# Patient Record
Sex: MALE | Race: WHITE
[De-identification: names, ages, dates, MRNs, and addresses within clinical notes are randomized per-mention and may not be internally consistent; named-entity substitution may affect disease eponyms.]

---

## 2018-02-09 ENCOUNTER — HOSPITAL ENCOUNTER (EMERGENCY)
Dept: HOSPITAL 31 - C.ER | Age: 43
Discharge: HOME | End: 2018-02-09
Payer: MEDICAID

## 2018-02-09 VITALS
SYSTOLIC BLOOD PRESSURE: 129 MMHG | RESPIRATION RATE: 20 BRPM | TEMPERATURE: 98 F | OXYGEN SATURATION: 96 % | DIASTOLIC BLOOD PRESSURE: 88 MMHG | HEART RATE: 102 BPM

## 2018-02-09 VITALS — BODY MASS INDEX: 22.6 KG/M2

## 2018-02-09 DIAGNOSIS — F10.10: Primary | ICD-10-CM

## 2018-02-09 NOTE — C.PDOC
History Of Present Illness





Patient is a 41 y/o male who presents to the ED requesting alcohol detox. 

Patient is currently in ED actively drinking a water bottle of vodka; refuses 

to relinquish to security. Patient has no other physical complaints at this 

time. 


Time Seen by Provider: 02/09/18 21:31


Chief Complaint (Nursing): Substance Abuse


History Per: Patient


History/Exam Limitations: no limitations


Onset/Duration Of Symptoms: Hrs


Current Symptoms Are (Timing): Still Present


Modifying Factor(s): Alcohol


Recent travel outside of the [place]: No





Past Medical History


Reviewed: Historical Data, Nursing Documentation, Vital Signs


Vital Signs: 


 Last Vital Signs











Temp  98.0 F   02/09/18 13:39


 


Pulse  102 H  02/09/18 13:39


 


Resp  20   02/09/18 13:39


 


BP  129/88   02/09/18 13:39


 


Pulse Ox  96   02/09/18 21:34














- Medical History


PMH: Depression, Hepatitis (just diagnosed), Pancreatitis


   Denies: Diabetes, HIV, HTN, Chronic Kidney Disease, Seizures, Sexually 

Transmitted Disease


Surgical History: No Surg Hx





- [facility] Procedures








ALCOHOL DETOXIFICATION (07/18/13)








Family History: States: No Known Family Hx





- Social History


Hx Tobacco Use: No


Hx Alcohol Use: Yes


Hx Substance Use: No





- Immunization History


Hx Tetanus Toxoid Vaccination: No


Hx Influenza Vaccination: No


Hx Pneumococcal Vaccination: No





Review Of Systems


Neurological: Positive for: Other (EtOH intoxification)


Psych: Positive for: Other (seeking alcohol detox)





Physical Exam





- Physical Exam


Appears: No Acute Distress, Other (EtOH on breath; argumentative and 

confrontational to stadd and security)


Skin: Warm, Dry


Head: Normacephalic





ED Course And Treatment


O2 Sat by Pulse Oximetry: 96





Medical Decision Making


Medical Decision Making: 





Security escorted patient from building for argumentative and confrontational 

behavior. Liquid was confirmed to be vodka and subsequently destroyed. 





Disposition


Doctor Will See Patient In The: Office


Counseled Patient/Family Regarding: Studies Performed





- Disposition


Disposition: HOME/ ROUTINE


Disposition Time: 14:00


Condition: GOOD


Forms:  [facility] (English)





- Clinical Impression


Clinical Impression: 


 Alcohol abuse








- Scribe Statement


The provider has reviewed the documentation as recorded by the Scribe





Wanda Pandey





All medical record entries made by the Scribe were at my direction and 

personally dictated by me. I have reviewed the chart and agree that the record 

accurately reflects my personal performance of the history, physical exam, 

medical decision making, and the department course for this patient. I have 

also personally directed, reviewed, and agree with the discharge instructions 

and disposition.

## 2021-04-29 ENCOUNTER — NEW PATIENT (OUTPATIENT)
Dept: URBAN - METROPOLITAN AREA CLINIC 73 | Facility: CLINIC | Age: 46
End: 2021-04-29

## 2021-04-29 VITALS — HEIGHT: 60 IN

## 2021-04-29 DIAGNOSIS — H35.23: ICD-10-CM

## 2021-04-29 DIAGNOSIS — H25.13: ICD-10-CM

## 2021-04-29 PROCEDURE — 92250 FUNDUS PHOTOGRAPHY W/I&R: CPT

## 2021-04-29 PROCEDURE — 92134 CPTRZ OPH DX IMG PST SGM RTA: CPT

## 2021-04-29 PROCEDURE — 92202 OPSCPY EXTND ON/MAC DRAW: CPT

## 2021-04-29 PROCEDURE — 99204 OFFICE O/P NEW MOD 45 MIN: CPT

## 2021-04-29 ASSESSMENT — TONOMETRY
OS_IOP_MMHG: 7
OD_IOP_MMHG: 9

## 2021-04-29 ASSESSMENT — VISUAL ACUITY
OS_CC: 20/40
OD_CC: 20/50

## 2021-07-01 ENCOUNTER — FOLLOW UP (OUTPATIENT)
Dept: URBAN - METROPOLITAN AREA CLINIC 73 | Facility: CLINIC | Age: 46
End: 2021-07-01

## 2021-07-01 DIAGNOSIS — H25.13: ICD-10-CM

## 2021-07-01 DIAGNOSIS — H35.23: ICD-10-CM

## 2021-07-01 PROCEDURE — 92014 COMPRE OPH EXAM EST PT 1/>: CPT

## 2021-07-01 PROCEDURE — 92202 OPSCPY EXTND ON/MAC DRAW: CPT

## 2021-07-01 PROCEDURE — 92134 CPTRZ OPH DX IMG PST SGM RTA: CPT

## 2021-07-01 ASSESSMENT — VISUAL ACUITY
OS_CC: 20/40
OD_CC: 20/60-2

## 2021-07-01 ASSESSMENT — TONOMETRY
OD_IOP_MMHG: 15
OS_IOP_MMHG: 16